# Patient Record
Sex: FEMALE | Race: BLACK OR AFRICAN AMERICAN | NOT HISPANIC OR LATINO | Employment: UNEMPLOYED | URBAN - METROPOLITAN AREA
[De-identification: names, ages, dates, MRNs, and addresses within clinical notes are randomized per-mention and may not be internally consistent; named-entity substitution may affect disease eponyms.]

---

## 2018-06-24 ENCOUNTER — HOSPITAL ENCOUNTER (INPATIENT)
Facility: HOSPITAL | Age: 48
LOS: 2 days | Discharge: HOME | DRG: 639 | End: 2018-06-26
Attending: EMERGENCY MEDICINE | Admitting: HOSPITALIST
Payer: MEDICAID

## 2018-06-24 DIAGNOSIS — R73.9 HYPERGLYCEMIA: Primary | ICD-10-CM

## 2018-06-24 PROBLEM — E11.9 NEW ONSET TYPE 2 DIABETES MELLITUS: Status: ACTIVE | Noted: 2018-06-24

## 2018-06-24 PROBLEM — E11.10 DIABETIC KETOACIDOSIS WITHOUT COMA ASSOCIATED WITH TYPE 2 DIABETES MELLITUS: Status: ACTIVE | Noted: 2018-06-24

## 2018-06-24 PROBLEM — A59.9 TRICHOMONAS INFECTION: Status: ACTIVE | Noted: 2018-06-24

## 2018-06-24 LAB
ALBUMIN SERPL BCP-MCNC: 3.7 G/DL
ALLENS TEST: ABNORMAL
ALP SERPL-CCNC: 108 U/L
ALT SERPL W/O P-5'-P-CCNC: 26 U/L
ANION GAP SERPL CALC-SCNC: 10 MMOL/L
ANION GAP SERPL CALC-SCNC: 16 MMOL/L
AST SERPL-CCNC: 23 U/L
B-HCG UR QL: NEGATIVE
B-OH-BUTYR BLD STRIP-SCNC: 1 MMOL/L
BACTERIA #/AREA URNS HPF: ABNORMAL /HPF
BASOPHILS # BLD AUTO: 0.08 K/UL
BASOPHILS NFR BLD: 1.1 %
BILIRUB SERPL-MCNC: 0.7 MG/DL
BILIRUB UR QL STRIP: NEGATIVE
BUN SERPL-MCNC: 11 MG/DL
BUN SERPL-MCNC: 8 MG/DL
CALCIUM SERPL-MCNC: 10.3 MG/DL
CALCIUM SERPL-MCNC: 8.7 MG/DL
CHLORIDE SERPL-SCNC: 102 MMOL/L
CHLORIDE SERPL-SCNC: 93 MMOL/L
CLARITY UR: CLEAR
CO2 SERPL-SCNC: 18 MMOL/L
CO2 SERPL-SCNC: 22 MMOL/L
COLOR UR: COLORLESS
CREAT SERPL-MCNC: 1.3 MG/DL
CREAT SERPL-MCNC: 1.7 MG/DL
CTP QC/QA: YES
DELSYS: ABNORMAL
DIFFERENTIAL METHOD: ABNORMAL
EOSINOPHIL # BLD AUTO: 0.1 K/UL
EOSINOPHIL NFR BLD: 1.5 %
ERYTHROCYTE [DISTWIDTH] IN BLOOD BY AUTOMATED COUNT: 13.4 %
EST. GFR  (AFRICAN AMERICAN): 41 ML/MIN/1.73 M^2
EST. GFR  (AFRICAN AMERICAN): 57 ML/MIN/1.73 M^2
EST. GFR  (NON AFRICAN AMERICAN): 36 ML/MIN/1.73 M^2
EST. GFR  (NON AFRICAN AMERICAN): 49 ML/MIN/1.73 M^2
ESTIMATED AVG GLUCOSE: ABNORMAL MG/DL
FIO2: 21
GLUCOSE SERPL-MCNC: 640 MG/DL
GLUCOSE SERPL-MCNC: 752 MG/DL
GLUCOSE SERPL-MCNC: >500 MG/DL (ref 70–110)
GLUCOSE UR QL STRIP: ABNORMAL
HBA1C MFR BLD HPLC: >14 %
HCO3 UR-SCNC: 27.2 MMOL/L (ref 24–28)
HCT VFR BLD AUTO: 37.1 %
HGB BLD-MCNC: 12.9 G/DL
HGB UR QL STRIP: NEGATIVE
KETONES UR QL STRIP: ABNORMAL
LEUKOCYTE ESTERASE UR QL STRIP: ABNORMAL
LYMPHOCYTES # BLD AUTO: 2.8 K/UL
LYMPHOCYTES NFR BLD: 38.6 %
MCH RBC QN AUTO: 27 PG
MCHC RBC AUTO-ENTMCNC: 34.8 G/DL
MCV RBC AUTO: 78 FL
MICROSCOPIC COMMENT: ABNORMAL
MODE: ABNORMAL
MONOCYTES # BLD AUTO: 0.5 K/UL
MONOCYTES NFR BLD: 6.4 %
NEUTROPHILS # BLD AUTO: 3.8 K/UL
NEUTROPHILS NFR BLD: 52.1 %
NITRITE UR QL STRIP: NEGATIVE
OSMOLALITY SERPL: 311 MOSM/KG
PCO2 BLDA: 41.7 MMHG (ref 35–45)
PH SMN: 7.42 [PH] (ref 7.35–7.45)
PH UR STRIP: 5 [PH] (ref 5–8)
PLATELET # BLD AUTO: 262 K/UL
PMV BLD AUTO: 11.1 FL
PO2 BLDA: 48 MMHG (ref 40–60)
POC BE: 2 MMOL/L
POC SATURATED O2: 84 % (ref 95–100)
POC TCO2: 28 MMOL/L (ref 24–29)
POCT GLUCOSE: 248 MG/DL (ref 70–110)
POCT GLUCOSE: 263 MG/DL (ref 70–110)
POCT GLUCOSE: 327 MG/DL (ref 70–110)
POCT GLUCOSE: 330 MG/DL (ref 70–110)
POCT GLUCOSE: 407 MG/DL (ref 70–110)
POCT GLUCOSE: 461 MG/DL (ref 70–110)
POCT GLUCOSE: 475 MG/DL (ref 70–110)
POTASSIUM SERPL-SCNC: 4.3 MMOL/L
POTASSIUM SERPL-SCNC: 4.5 MMOL/L
PROT SERPL-MCNC: 7.7 G/DL
PROT UR QL STRIP: NEGATIVE
RBC # BLD AUTO: 4.77 M/UL
RBC #/AREA URNS HPF: 1 /HPF (ref 0–4)
SAMPLE: ABNORMAL
SITE: ABNORMAL
SODIUM SERPL-SCNC: 127 MMOL/L
SODIUM SERPL-SCNC: 134 MMOL/L
SP GR UR STRIP: 1.02 (ref 1–1.03)
SP02: 98
SQUAMOUS #/AREA URNS HPF: ABNORMAL /HPF
TRICHOMONAS UR QL MICRO: ABNORMAL
URN SPEC COLLECT METH UR: ABNORMAL
UROBILINOGEN UR STRIP-ACNC: NEGATIVE EU/DL
WBC # BLD AUTO: 7.23 K/UL
WBC #/AREA URNS HPF: 7 /HPF (ref 0–5)
WBC CLUMPS URNS QL MICRO: ABNORMAL
YEAST URNS QL MICRO: ABNORMAL

## 2018-06-24 PROCEDURE — 63600175 PHARM REV CODE 636 W HCPCS: Performed by: EMERGENCY MEDICINE

## 2018-06-24 PROCEDURE — 85025 COMPLETE CBC W/AUTO DIFF WBC: CPT

## 2018-06-24 PROCEDURE — 96375 TX/PRO/DX INJ NEW DRUG ADDON: CPT

## 2018-06-24 PROCEDURE — 25000003 PHARM REV CODE 250: Performed by: EMERGENCY MEDICINE

## 2018-06-24 PROCEDURE — 94761 N-INVAS EAR/PLS OXIMETRY MLT: CPT

## 2018-06-24 PROCEDURE — 81025 URINE PREGNANCY TEST: CPT | Performed by: NURSE PRACTITIONER

## 2018-06-24 PROCEDURE — 82962 GLUCOSE BLOOD TEST: CPT

## 2018-06-24 PROCEDURE — 99900035 HC TECH TIME PER 15 MIN (STAT)

## 2018-06-24 PROCEDURE — 83036 HEMOGLOBIN GLYCOSYLATED A1C: CPT

## 2018-06-24 PROCEDURE — 82803 BLOOD GASES ANY COMBINATION: CPT

## 2018-06-24 PROCEDURE — 21400001 HC TELEMETRY ROOM

## 2018-06-24 PROCEDURE — 25000003 PHARM REV CODE 250: Performed by: HOSPITALIST

## 2018-06-24 PROCEDURE — 83930 ASSAY OF BLOOD OSMOLALITY: CPT

## 2018-06-24 PROCEDURE — 99285 EMERGENCY DEPT VISIT HI MDM: CPT | Mod: 25

## 2018-06-24 PROCEDURE — 80053 COMPREHEN METABOLIC PANEL: CPT

## 2018-06-24 PROCEDURE — 93005 ELECTROCARDIOGRAM TRACING: CPT

## 2018-06-24 PROCEDURE — 63600175 PHARM REV CODE 636 W HCPCS: Performed by: NURSE PRACTITIONER

## 2018-06-24 PROCEDURE — 93010 ELECTROCARDIOGRAM REPORT: CPT | Mod: ,,, | Performed by: INTERNAL MEDICINE

## 2018-06-24 PROCEDURE — 36415 COLL VENOUS BLD VENIPUNCTURE: CPT

## 2018-06-24 PROCEDURE — 81000 URINALYSIS NONAUTO W/SCOPE: CPT

## 2018-06-24 PROCEDURE — 25000003 PHARM REV CODE 250: Performed by: NURSE PRACTITIONER

## 2018-06-24 PROCEDURE — 82010 KETONE BODYS QUAN: CPT

## 2018-06-24 PROCEDURE — 96376 TX/PRO/DX INJ SAME DRUG ADON: CPT

## 2018-06-24 PROCEDURE — 96374 THER/PROPH/DIAG INJ IV PUSH: CPT

## 2018-06-24 PROCEDURE — 63600175 PHARM REV CODE 636 W HCPCS: Performed by: HOSPITALIST

## 2018-06-24 PROCEDURE — 80048 BASIC METABOLIC PNL TOTAL CA: CPT

## 2018-06-24 PROCEDURE — 96361 HYDRATE IV INFUSION ADD-ON: CPT

## 2018-06-24 RX ORDER — BUPROPION HYDROCHLORIDE 100 MG/1
100 TABLET, EXTENDED RELEASE ORAL 2 TIMES DAILY
COMMUNITY

## 2018-06-24 RX ORDER — POLYETHYLENE GLYCOL 3350 17 G/17G
17 POWDER, FOR SOLUTION ORAL DAILY
Status: DISCONTINUED | OUTPATIENT
Start: 2018-06-24 | End: 2018-06-26 | Stop reason: HOSPADM

## 2018-06-24 RX ORDER — OLANZAPINE 10 MG/1
15 TABLET ORAL NIGHTLY
COMMUNITY

## 2018-06-24 RX ORDER — ENOXAPARIN SODIUM 100 MG/ML
40 INJECTION SUBCUTANEOUS EVERY 24 HOURS
Status: DISCONTINUED | OUTPATIENT
Start: 2018-06-24 | End: 2018-06-26 | Stop reason: HOSPADM

## 2018-06-24 RX ORDER — ONDANSETRON 2 MG/ML
4 INJECTION INTRAMUSCULAR; INTRAVENOUS EVERY 8 HOURS PRN
Status: DISCONTINUED | OUTPATIENT
Start: 2018-06-24 | End: 2018-06-26 | Stop reason: HOSPADM

## 2018-06-24 RX ORDER — HYDROCODONE BITARTRATE AND ACETAMINOPHEN 5; 325 MG/1; MG/1
1 TABLET ORAL EVERY 4 HOURS PRN
Status: DISCONTINUED | OUTPATIENT
Start: 2018-06-24 | End: 2018-06-26 | Stop reason: HOSPADM

## 2018-06-24 RX ORDER — BUPROPION HYDROCHLORIDE 100 MG/1
100 TABLET, EXTENDED RELEASE ORAL 2 TIMES DAILY
Status: DISCONTINUED | OUTPATIENT
Start: 2018-06-24 | End: 2018-06-26 | Stop reason: HOSPADM

## 2018-06-24 RX ORDER — METRONIDAZOLE 500 MG/1
500 TABLET ORAL EVERY 12 HOURS
Status: DISCONTINUED | OUTPATIENT
Start: 2018-06-24 | End: 2018-06-26 | Stop reason: HOSPADM

## 2018-06-24 RX ORDER — PANTOPRAZOLE SODIUM 40 MG/1
40 FOR SUSPENSION ORAL DAILY
Status: DISCONTINUED | OUTPATIENT
Start: 2018-06-24 | End: 2018-06-26 | Stop reason: HOSPADM

## 2018-06-24 RX ORDER — IBUPROFEN 200 MG
16 TABLET ORAL
Status: DISCONTINUED | OUTPATIENT
Start: 2018-06-24 | End: 2018-06-26 | Stop reason: HOSPADM

## 2018-06-24 RX ORDER — SODIUM CHLORIDE 9 MG/ML
INJECTION, SOLUTION INTRAVENOUS CONTINUOUS
Status: DISCONTINUED | OUTPATIENT
Start: 2018-06-24 | End: 2018-06-25

## 2018-06-24 RX ORDER — ONDANSETRON 2 MG/ML
4 INJECTION INTRAMUSCULAR; INTRAVENOUS
Status: COMPLETED | OUTPATIENT
Start: 2018-06-24 | End: 2018-06-24

## 2018-06-24 RX ORDER — INSULIN ASPART 100 [IU]/ML
1-10 INJECTION, SOLUTION INTRAVENOUS; SUBCUTANEOUS
Status: DISCONTINUED | OUTPATIENT
Start: 2018-06-24 | End: 2018-06-26 | Stop reason: HOSPADM

## 2018-06-24 RX ORDER — IBUPROFEN 200 MG
24 TABLET ORAL
Status: DISCONTINUED | OUTPATIENT
Start: 2018-06-24 | End: 2018-06-26 | Stop reason: HOSPADM

## 2018-06-24 RX ORDER — MICONAZOLE NITRATE 2 %
1 CREAM WITH APPLICATOR VAGINAL NIGHTLY
Status: DISCONTINUED | OUTPATIENT
Start: 2018-06-24 | End: 2018-06-26 | Stop reason: HOSPADM

## 2018-06-24 RX ORDER — CYCLOBENZAPRINE HCL 5 MG
5 TABLET ORAL 3 TIMES DAILY PRN
Status: DISCONTINUED | OUTPATIENT
Start: 2018-06-24 | End: 2018-06-26 | Stop reason: HOSPADM

## 2018-06-24 RX ORDER — FLUCONAZOLE 150 MG/1
150 TABLET ORAL ONCE
Status: COMPLETED | OUTPATIENT
Start: 2018-06-24 | End: 2018-06-24

## 2018-06-24 RX ORDER — GLUCAGON 1 MG
1 KIT INJECTION
Status: DISCONTINUED | OUTPATIENT
Start: 2018-06-24 | End: 2018-06-26 | Stop reason: HOSPADM

## 2018-06-24 RX ORDER — CYCLOBENZAPRINE HCL 5 MG
5 TABLET ORAL 3 TIMES DAILY PRN
COMMUNITY

## 2018-06-24 RX ORDER — INSULIN ASPART 100 [IU]/ML
10 INJECTION, SOLUTION INTRAVENOUS; SUBCUTANEOUS
Status: DISCONTINUED | OUTPATIENT
Start: 2018-06-24 | End: 2018-06-25

## 2018-06-24 RX ADMIN — ENOXAPARIN SODIUM 40 MG: 100 INJECTION SUBCUTANEOUS at 05:06

## 2018-06-24 RX ADMIN — SODIUM CHLORIDE: 0.9 INJECTION, SOLUTION INTRAVENOUS at 04:06

## 2018-06-24 RX ADMIN — OLANZAPINE 15 MG: 10 TABLET, FILM COATED ORAL at 08:06

## 2018-06-24 RX ADMIN — ONDANSETRON 4 MG: 2 INJECTION INTRAMUSCULAR; INTRAVENOUS at 01:06

## 2018-06-24 RX ADMIN — INSULIN HUMAN 12 UNITS: 100 INJECTION, SOLUTION PARENTERAL at 01:06

## 2018-06-24 RX ADMIN — PANTOPRAZOLE SODIUM 40 MG: 40 GRANULE, DELAYED RELEASE ORAL at 09:06

## 2018-06-24 RX ADMIN — FLUCONAZOLE 150 MG: 150 TABLET ORAL at 11:06

## 2018-06-24 RX ADMIN — INSULIN ASPART 10 UNITS: 100 INJECTION, SOLUTION INTRAVENOUS; SUBCUTANEOUS at 12:06

## 2018-06-24 RX ADMIN — SODIUM CHLORIDE 1000 ML: 0.9 INJECTION, SOLUTION INTRAVENOUS at 01:06

## 2018-06-24 RX ADMIN — MICONAZOLE NITRATE 1 APPLICATOR: 20 CREAM VAGINAL at 08:06

## 2018-06-24 RX ADMIN — BUPROPION HYDROCHLORIDE 100 MG: 100 TABLET, FILM COATED, EXTENDED RELEASE ORAL at 08:06

## 2018-06-24 RX ADMIN — INSULIN HUMAN 10 UNITS: 100 INJECTION, SOLUTION PARENTERAL at 02:06

## 2018-06-24 RX ADMIN — METRONIDAZOLE 500 MG: 500 TABLET ORAL at 10:06

## 2018-06-24 RX ADMIN — METRONIDAZOLE 500 MG: 500 TABLET ORAL at 08:06

## 2018-06-24 RX ADMIN — INSULIN ASPART 10 UNITS: 100 INJECTION, SOLUTION INTRAVENOUS; SUBCUTANEOUS at 05:06

## 2018-06-24 RX ADMIN — POLYETHYLENE GLYCOL 3350 17 G: 17 POWDER, FOR SOLUTION ORAL at 09:06

## 2018-06-24 RX ADMIN — INSULIN DETEMIR 25 UNITS: 100 INJECTION, SOLUTION SUBCUTANEOUS at 08:06

## 2018-06-24 RX ADMIN — SODIUM CHLORIDE: 0.9 INJECTION, SOLUTION INTRAVENOUS at 08:06

## 2018-06-24 RX ADMIN — INSULIN ASPART 4 UNITS: 100 INJECTION, SOLUTION INTRAVENOUS; SUBCUTANEOUS at 09:06

## 2018-06-24 RX ADMIN — INSULIN ASPART 10 UNITS: 100 INJECTION, SOLUTION INTRAVENOUS; SUBCUTANEOUS at 08:06

## 2018-06-24 RX ADMIN — BUPROPION HYDROCHLORIDE 100 MG: 100 TABLET, FILM COATED, EXTENDED RELEASE ORAL at 09:06

## 2018-06-24 RX ADMIN — INSULIN ASPART 8 UNITS: 100 INJECTION, SOLUTION INTRAVENOUS; SUBCUTANEOUS at 05:06

## 2018-06-24 NOTE — PLAN OF CARE
06/24/18 0857   Discharge Assessment   Assessment Type Discharge Planning Assessment   Confirmed/corrected address and phone number on facesheet? Yes   Assessment information obtained from? Patient   Communicated expected length of stay with patient/caregiver no   Prior to hospitilization cognitive status: Alert/Oriented   Prior to hospitalization functional status: Independent   Current cognitive status: Alert/Oriented   Current Functional Status: Independent   Facility Arrived From: Home   Lives With sibling(s)   Able to Return to Prior Arrangements yes   Is patient able to care for self after discharge? Yes   Who are your caregiver(s) and their phone number(s)? Zofia-sister: [phone]   Patient's perception of discharge disposition home or selfcare   Readmission Within The Last 30 Days no previous admission in last 30 days   Patient currently being followed by outpatient case management? No   Patient currently receives any other outside agency services? No   Equipment Currently Used at Home none   Do you have any problems affording any of your prescribed medications? No   Is the patient taking medications as prescribed? yes   Does the patient have transportation home? Yes   Transportation Available family or friend will provide   Does the patient receive services at the [facility]? No   Discharge Plan A Other  (Unsure of any needs at this time)   Discharge Plan B Other  (TBD)   Patient/Family In Agreement With Plan yes     Independent at home with sister who can assist if needed; no assist usually need; no current services or DME; unsure of discharge needs at this time    PCP: Shantell Frederick NP  Emergency Contact Information: Maria Eugenia Williamsony  Address: [address] [place] [address]  Contact Number: [phone]  Relationship: Sister    [facility]  [address] [place], [place] [id number] [phone] (fax)    PCP: Phu Trotter MD  Extended Emergency Contact  Information:  Primary Emergency Contact: Yoana Gamez  Relationship: Spouse  Address: [address] [place] [address] [facility] [[email or web address]]  Home Phone: [phone]  Mobile Phone: [phone]    [facility] [address] [facility]  [address] [facility]   [place] [address]  Contact: [phone]  Fax: [phone]     Payor: [place] [employer] [facility] Med/Plan: Medicaid  Product Type: Medicaid    1)  Warning signs/symptoms information reviewed with and provided to patient in blue folder  2)  Discharge planning begins upon admission   3)  Discussed and reinforced patient responsibility for managing health care at home including:        a) Acquiring prescribed medications; b) following-through with scheduled follow-up appointments or scheduling those that could not be set; and c) monitoring health at home.

## 2018-06-24 NOTE — ED PROVIDER NOTES
"Encounter Date: 2018  SORT MSE:  Pt is a 47 y.o. female who presents for emergent consideration for cbg>500. Pt will be moved to room when one is available, otherwise will wait in waiting room with triage nurse supervision.  Pt arrived by ambulatory. She is not in distress. Orders have been placed. MEGAN Hay DNP [facility] 2018 0016       SCRIBE #1 NOTE: I, Anand Sequeira, am scribing for, and in the presence of,  Angel Bull MD. I have scribed the following portions of the note - Other sections scribed: HPI, ROS.       History     Chief Complaint   Patient presents with    Hyperglycemia     reports taking CBG at home and her machine read "high"; reports polyuria, polydipsia, and polyphagia; reports two episodes of diarrhea but multiple episodes of vomiting with constant causea     CC: Hyperglycemia    HPI: 46 y/o female smoker with medical hx of diabetes, psychosis, and PTSD presents to the ED c/o x3 week hx of hyperglycemia. Pt reports that she has been tracking it getting numbers that are "high", noting "400+" or "500+". Pt is also c/o chills, HA, polyuria, polydipsia, polyphagia, mild bilateral costal margin tenderness, and dysuria. Pt describes the dysuria as "not like a yeast infection" and "like someone is sticking me". Pt denies cough, SOB, diarrhea, ear pain, sore throat, any any other associated symptoms. No modifying factors or tx PTA.      The history is provided by the patient. No  was used.     Review of patient's allergies indicates:  Allergies not on file  Past Medical History:   Diagnosis Date    Anxiety     Depression     Diabetes mellitus     Hypertension     Panic attacks     Post traumatic stress disorder (PTSD)     Psychosis      Past Surgical History:   Procedure Laterality Date     SECTION      TUBAL LIGATION       Family History   Problem Relation Age of Onset    Diabetes Mother     Diabetes Father     Diabetes Sister     Diabetes Brother "      Social History   Substance Use Topics    Smoking status: Current Every Day Smoker     Packs/day: 0.50     Years: 25.00     Types: Cigarettes    Smokeless tobacco: Never Used    Alcohol use No     Review of Systems   Constitutional: Positive for chills. Negative for fever.   HENT: Negative for congestion, ear pain, rhinorrhea and sore throat.    Eyes: Negative for pain and redness.   Respiratory: Negative for cough and shortness of breath.    Cardiovascular: Negative for chest pain.   Gastrointestinal: Positive for abdominal pain (bilateral costal margin tenderness). Negative for diarrhea, nausea and vomiting.   Endocrine: Positive for polydipsia, polyphagia and polyuria.   Genitourinary: Positive for dysuria. Negative for flank pain and hematuria.   Musculoskeletal: Negative for back pain and neck pain.   Skin: Negative for rash.   Neurological: Positive for headaches. Negative for light-headedness and numbness.   All other systems reviewed and are negative.      Physical Exam     Initial Vitals [06/24/18 0021]   BP Pulse Resp Temp SpO2   104/71 110 19 98.9 °F (37.2 °C) 98 %      MAP       --         Physical Exam  The patient was examined specifically for the following:   General:No significant distress, Good color, Warm and dry. Head and neck:Scalp atraumatic, Neck supple. Neurological:Appropriate conversation, Gross motor deficits. Eyes:Conjugate gaze, Clear corneas. ENT: No epistaxis. Cardiac: Regular rate and rhythm, Grossly normal heart tones. Pulmonary: Wheezing, Rales. Gastrointestinal: Abdominal tenderness, Abdominal distention. Musculoskeletal: Extremity deformity, Apparent pain with range of motion of the joints. Skin: Rash.   The findings on examination were normal except for the following:  There is some minimal tenderness at the costal margins bilaterally.  There is extremely mild epigastric tenderness.  The lungs are clear.  The heart tones are normal. The abdomen is soft.    ED Course    Procedures  Labs Reviewed   CBC W/ AUTO DIFFERENTIAL - Abnormal; Notable for the following:        Result Value    MCV 78 (*)     All other components within normal limits   COMPREHENSIVE METABOLIC PANEL - Abnormal; Notable for the following:     Sodium 127 (*)     Chloride 93 (*)     CO2 18 (*)     Glucose 752 (*)     Creatinine 1.7 (*)     eGFR if  41 (*)     eGFR if non  36 (*)     All other components within normal limits    Narrative:     GLUCOSE  critical result(s) called and verbal readback obtained from   DR. YE.  , 06/24/2018 01:48   BETA - HYDROXYBUTYRATE, SERUM - Abnormal; Notable for the following:     Beta-Hydroxybutyrate 1.0 (*)     All other components within normal limits   URINALYSIS, REFLEX TO URINE CULTURE - Abnormal; Notable for the following:     Color, UA Colorless (*)     Glucose, UA 3+ (*)     Ketones, UA Trace (*)     Leukocytes, UA Trace (*)     All other components within normal limits   OSMOLALITY - Abnormal; Notable for the following:     Osmolality 311 (*)     All other components within normal limits   URINALYSIS MICROSCOPIC - Abnormal; Notable for the following:     WBC, UA 7 (*)     WBC Clumps, UA Occasional (*)     Yeast, UA Occasional (*)     Trichomonas, UA Few (*)     All other components within normal limits   ISTAT PROCEDURE - Abnormal; Notable for the following:     POC SATURATED O2 84 (*)     All other components within normal limits   POCT URINE PREGNANCY          Imaging Results    None                     Scribe Attestation:   Scribe #1: I performed the above scribed service and the documentation accurately describes the services I performed. I attest to the accuracy of the note.    Attending Attestation:           Physician Attestation for Scribe:  Physician Attestation Statement for Scribe #1: I, Angel Ye MD, reviewed documentation, as scribed by Anand Sequeira in my presence, and it is both accurate and complete.                     Clinical Impression:   The encounter diagnosis was Hyperglycemia.                             Angel Bull MD  07/02/18 4328

## 2018-06-24 NOTE — ED TRIAGE NOTES
"Pt. Ambulates to room with complains of dizziness, and high blood sugar. Pt. States she has been feeling dizzy with frequent thirst, frequent urination and frqequent hunger for the past 3 weeks. Pt. States states that she was told that she is borderline diabetic by her old doctor. Pt. States that she took her blood glucose reading at home and it states "HI" both times yesterday.Pt. Denies any dyspnea or chest pain.  "

## 2018-06-24 NOTE — PROGRESS NOTES
Results reported to Dr Bull.   Results for ORIANA UREÑA (MRN [id number]) as of 6/24/2018 03:29   Ref. Range 6/24/2018 02:01   POC PH Latest Ref Range: 7.35 - 7.45  7.422   POC PCO2 Latest Ref Range: 35 - 45 mmHg 41.7   POC PO2 Latest Ref Range: 40 - 60 mmHg 48   POC BE Latest Ref Range: -2 to 2 mmol/L 2   POC HCO3 Latest Ref Range: 24 - 28 mmol/L 27.2   POC SATURATED O2 Latest Ref Range: 95 - 100 % 84 (L)   POC TCO2 Latest Ref Range: 24 - 29 mmol/L 28   FiO2 Unknown 21   Sample Unknown VENOUS   DelSys Unknown Room Air   Allens Test Unknown N/A   Site Unknown Other   Mode Unknown SPONT

## 2018-06-24 NOTE — ASSESSMENT & PLAN NOTE
Diabetic diet  NS IVF  A1c pending  Basal -bolus, novolog with meals   Diabetes education   Will need own glucometer

## 2018-06-24 NOTE — HPI
"46 yo female with PTSD, tobacco abuse and psychosis presented with blood sugars >500. She reports strong family h/o diabetes and was using her sister's glucometer and it was persistently reading high. She has associated increased thirst and urination. Also describes a "burning and itch" with urination. On admit, BS >700. UA significant for trichomonas, 3+ glucose and trace ketones. Her pH was 7.4 and with normal AG, she was admitted to floor for IVF and diabetes management.    "

## 2018-06-24 NOTE — H&P
"[facility] Hospital Medicine  History & Physical    Patient Name: Madhavi Williamson  MRN: [id number]  Admission Date: 2018  Attending Physician: Dimple Andrew MD   Primary Care Provider: Shantell Frederick NP         Patient information was obtained from patient and ER records.     Subjective:     Principal Problem: abdominal pain. N/v   Chief Complaint:   Chief Complaint   Patient presents with    Hyperglycemia     reports taking CBG at home and her machine read "high"; reports polyuria, polydipsia, and polyphagia; reports two episodes of diarrhea but multiple episodes of vomiting with constant causea        HPI: 48 yo female with PTSD, tobacco abuse and psychosis presented with blood sugars >500. She reports strong family h/o diabetes and was using her sister's glucometer and it was persistently reading high. She has associated increased thirst and urination. Also describes a "burning and itch" with urination. On admit, BS >700. UA significant for trichomonas, 3+ glucose and trace ketones. Her pH was 7.4 and with normal AG, she was admitted to floor for IVF and diabetes management.      Past Medical History:   Diagnosis Date    Anxiety     Depression     Diabetes mellitus     Hypertension     Panic attacks     Post traumatic stress disorder (PTSD)     Psychosis        Past Surgical History:   Procedure Laterality Date     SECTION      TUBAL LIGATION         Review of patient's allergies indicates:   Allergen Reactions    Asa [aspirin]     Prozac [fluoxetine]     Seroquel [quetiapine]        No current facility-administered medications on file prior to encounter.      No current outpatient prescriptions on file prior to encounter.     Family History     Problem Relation (Age of Onset)    Diabetes Mother, Father, Sister, Brother        Social History Main Topics    Smoking status: Current Every Day Smoker     Packs/day: 0.50     Years: 25.00     Types: Cigarettes    " Smokeless tobacco: Never Used    Alcohol use No    Drug use: Yes     Types: Marijuana      Comment: hx of weed smoking    Sexual activity: Yes     Review of Systems   Constitutional: Positive for appetite change and fatigue.   HENT: Negative.    Eyes: Negative.    Respiratory: Negative.    Cardiovascular: Negative.    Gastrointestinal: Positive for abdominal pain, nausea and vomiting.   Endocrine: Positive for polydipsia and polyuria.   Genitourinary: Positive for vaginal discharge.   Musculoskeletal: Negative.    Skin: Negative.    Neurological: Negative.    Psychiatric/Behavioral: Negative.      Objective:     Vital Signs (Most Recent):  Temp: 97.6 °F (36.4 °C) (06/24/18 0715)  Pulse: 100 (06/24/18 0715)  Resp: 18 (06/24/18 0715)  BP: 130/73 (06/24/18 0715)  SpO2: 100 % (06/24/18 0718) Vital Signs (24h Range):  Temp:  [97.6 °F (36.4 °C)-98.9 °F (37.2 °C)] 97.6 °F (36.4 °C)  Pulse:  [] 100  Resp:  [18-19] 18  SpO2:  [97 %-100 %] 100 %  BP: (104-136)/(71-83) 130/73     Weight: 92.6 kg (204 lb 2.3 oz)  Body mass index is 31.5 kg/m².    Physical Exam   Constitutional: She is oriented to person, place, and time. She appears well-developed and well-nourished. No distress.   HENT:   Head: Normocephalic and atraumatic.   Mouth/Throat: Oropharynx is clear and moist.   Eyes: EOM are normal. Pupils are equal, round, and reactive to light.   Neck: Normal range of motion. Neck supple.   Cardiovascular: Regular rhythm, normal heart sounds and intact distal pulses.    Tachycardia S1 S2   Pulmonary/Chest: Effort normal and breath sounds normal. No respiratory distress.   Abdominal: Soft. Bowel sounds are normal. There is tenderness. There is no rebound.   Musculoskeletal: Normal range of motion. She exhibits no edema.   Neurological: She is alert and oriented to person, place, and time.   Skin: Skin is warm and dry. Capillary refill takes less than 2 seconds. No erythema.   Psychiatric: She has a normal mood and affect.  Her behavior is normal.         CRANIAL NERVES     CN III, IV, VI   Pupils are equal, round, and reactive to light.  Extraocular motions are normal.        Significant Labs:   A1C: No results for input(s): HGBA1C in the last 4320 hours.  CBC:   Recent Labs  Lab 06/24/18  0040   WBC 7.23   HGB 12.9   HCT 37.1        CMP:   Recent Labs  Lab 06/24/18  0040 06/24/18  0802   * 134*   K 4.5 4.3   CL 93* 102   CO2 18* 22*   * 640*   BUN 11 8   CREATININE 1.7* 1.3   CALCIUM 10.3 8.7   PROT 7.7  --    ALBUMIN 3.7  --    BILITOT 0.7  --    ALKPHOS 108  --    AST 23  --    ALT 26  --    ANIONGAP 16 10   EGFRNONAA 36* 49*     Cardiac Markers: No results for input(s): CKMB, MYOGLOBIN, BNP, TROPISTAT in the last 48 hours.  Magnesium: No results for input(s): MG in the last 48 hours.  POCT Glucose:   Recent Labs  Lab 06/24/18  0454 06/24/18  0714   POCTGLUCOSE 263* 461*     Troponin: No results for input(s): TROPONINI in the last 48 hours.  Urine Studies:   Recent Labs  Lab 06/24/18  0039   COLORU Colorless*   APPEARANCEUA Clear   PHUR 5.0   SPECGRAV 1.025   PROTEINUA Negative   GLUCUA 3+*   KETONESU Trace*   BILIRUBINUA Negative   OCCULTUA Negative   NITRITE Negative   UROBILINOGEN Negative   LEUKOCYTESUR Trace*   RBCUA 1   WBCUA 7*   BACTERIA Occasional   SQUAMEPITHEL Occasional       Significant Imaging: I have reviewed all pertinent imaging results/findings within the past 24 hours.    Assessment/Plan:     Trichomonas infection    Counseled on treatment of herself and sexual partner  Flagyl BID           New onset type 2 diabetes mellitus    Diabetic diet  NS IVF  A1c pending  Basal -bolus, novolog with meals   Diabetes education   Will need own glucometer           VTE Risk Mitigation         Ordered     enoxaparin injection 40 mg  Daily      06/24/18 0450     IP VTE HIGH RISK PATIENT  Once      06/24/18 0450             Dimple Andrew MD  Department of Hospital Medicine   [facility]

## 2018-06-24 NOTE — SUBJECTIVE & OBJECTIVE
Past Medical History:   Diagnosis Date    Anxiety     Depression     Diabetes mellitus     Hypertension     Panic attacks     Post traumatic stress disorder (PTSD)     Psychosis        Past Surgical History:   Procedure Laterality Date     SECTION      TUBAL LIGATION         Review of patient's allergies indicates:   Allergen Reactions    Asa [aspirin]     Prozac [fluoxetine]     Seroquel [quetiapine]        No current facility-administered medications on file prior to encounter.      No current outpatient prescriptions on file prior to encounter.     Family History     Problem Relation (Age of Onset)    Diabetes Mother, Father, Sister, Brother        Social History Main Topics    Smoking status: Current Every Day Smoker     Packs/day: 0.50     Years: 25.00     Types: Cigarettes    Smokeless tobacco: Never Used    Alcohol use No    Drug use: Yes     Types: Marijuana      Comment: hx of weed smoking    Sexual activity: Yes     Review of Systems   Constitutional: Positive for appetite change and fatigue.   HENT: Negative.    Eyes: Negative.    Respiratory: Negative.    Cardiovascular: Negative.    Gastrointestinal: Positive for abdominal pain, nausea and vomiting.   Endocrine: Positive for polydipsia and polyuria.   Genitourinary: Positive for vaginal discharge.   Musculoskeletal: Negative.    Skin: Negative.    Neurological: Negative.    Psychiatric/Behavioral: Negative.      Objective:     Vital Signs (Most Recent):  Temp: 97.6 °F (36.4 °C) (18)  Pulse: 100 (18)  Resp: 18 (18)  BP: 130/73 (18)  SpO2: 100 % (18) Vital Signs (24h Range):  Temp:  [97.6 °F (36.4 °C)-98.9 °F (37.2 °C)] 97.6 °F (36.4 °C)  Pulse:  [] 100  Resp:  [18-19] 18  SpO2:  [97 %-100 %] 100 %  BP: (104-136)/(71-83) 130/73     Weight: 92.6 kg (204 lb 2.3 oz)  Body mass index is 31.5 kg/m².    Physical Exam   Constitutional: She is oriented to person, place, and  time. She appears well-developed and well-nourished. No distress.   HENT:   Head: Normocephalic and atraumatic.   Mouth/Throat: Oropharynx is clear and moist.   Eyes: EOM are normal. Pupils are equal, round, and reactive to light.   Neck: Normal range of motion. Neck supple.   Cardiovascular: Regular rhythm, normal heart sounds and intact distal pulses.    Tachycardia S1 S2   Pulmonary/Chest: Effort normal and breath sounds normal. No respiratory distress.   Abdominal: Soft. Bowel sounds are normal. There is tenderness. There is no rebound.   Musculoskeletal: Normal range of motion. She exhibits no edema.   Neurological: She is alert and oriented to person, place, and time.   Skin: Skin is warm and dry. Capillary refill takes less than 2 seconds. No erythema.   Psychiatric: She has a normal mood and affect. Her behavior is normal.         CRANIAL NERVES     CN III, IV, VI   Pupils are equal, round, and reactive to light.  Extraocular motions are normal.        Significant Labs:   A1C: No results for input(s): HGBA1C in the last 4320 hours.  CBC:   Recent Labs  Lab 06/24/18  0040   WBC 7.23   HGB 12.9   HCT 37.1        CMP:   Recent Labs  Lab 06/24/18  0040 06/24/18  0802   * 134*   K 4.5 4.3   CL 93* 102   CO2 18* 22*   * 640*   BUN 11 8   CREATININE 1.7* 1.3   CALCIUM 10.3 8.7   PROT 7.7  --    ALBUMIN 3.7  --    BILITOT 0.7  --    ALKPHOS 108  --    AST 23  --    ALT 26  --    ANIONGAP 16 10   EGFRNONAA 36* 49*     Cardiac Markers: No results for input(s): CKMB, MYOGLOBIN, BNP, TROPISTAT in the last 48 hours.  Magnesium: No results for input(s): MG in the last 48 hours.  POCT Glucose:   Recent Labs  Lab 06/24/18  0454 06/24/18  0714   POCTGLUCOSE 263* 461*     Troponin: No results for input(s): TROPONINI in the last 48 hours.  Urine Studies:   Recent Labs  Lab 06/24/18  0039   COLORU Colorless*   APPEARANCEUA Clear   PHUR 5.0   SPECGRAV 1.025   PROTEINUA Negative   GLUCUA 3+*   KETONESU Trace*    BILIRUBINUA Negative   OCCULTUA Negative   NITRITE Negative   UROBILINOGEN Negative   LEUKOCYTESUR Trace*   RBCUA 1   WBCUA 7*   BACTERIA Occasional   SQUAMEPITHEL Occasional       Significant Imaging: I have reviewed all pertinent imaging results/findings within the past 24 hours.

## 2018-06-25 LAB
ANION GAP SERPL CALC-SCNC: 6 MMOL/L
BASOPHILS # BLD AUTO: 0.06 K/UL
BASOPHILS NFR BLD: 1 %
BUN SERPL-MCNC: 7 MG/DL
CALCIUM SERPL-MCNC: 8.4 MG/DL
CHLORIDE SERPL-SCNC: 109 MMOL/L
CO2 SERPL-SCNC: 24 MMOL/L
CREAT SERPL-MCNC: 1.1 MG/DL
DIFFERENTIAL METHOD: ABNORMAL
EOSINOPHIL # BLD AUTO: 0.2 K/UL
EOSINOPHIL NFR BLD: 3.7 %
ERYTHROCYTE [DISTWIDTH] IN BLOOD BY AUTOMATED COUNT: 13.5 %
EST. GFR  (AFRICAN AMERICAN): >60 ML/MIN/1.73 M^2
EST. GFR  (NON AFRICAN AMERICAN): >60 ML/MIN/1.73 M^2
GLUCOSE SERPL-MCNC: 392 MG/DL
HCT VFR BLD AUTO: 32.3 %
HGB BLD-MCNC: 10.9 G/DL
LYMPHOCYTES # BLD AUTO: 3.4 K/UL
LYMPHOCYTES NFR BLD: 54.6 %
MCH RBC QN AUTO: 26.8 PG
MCHC RBC AUTO-ENTMCNC: 33.7 G/DL
MCV RBC AUTO: 80 FL
MONOCYTES # BLD AUTO: 0.3 K/UL
MONOCYTES NFR BLD: 5.4 %
NEUTROPHILS # BLD AUTO: 2.2 K/UL
NEUTROPHILS NFR BLD: 35 %
PLATELET # BLD AUTO: 240 K/UL
PMV BLD AUTO: 11 FL
POCT GLUCOSE: 275 MG/DL (ref 70–110)
POCT GLUCOSE: 322 MG/DL (ref 70–110)
POCT GLUCOSE: 325 MG/DL (ref 70–110)
POCT GLUCOSE: 332 MG/DL (ref 70–110)
POCT GLUCOSE: >500 MG/DL (ref 70–110)
POTASSIUM SERPL-SCNC: 4.1 MMOL/L
RBC # BLD AUTO: 4.06 M/UL
SODIUM SERPL-SCNC: 139 MMOL/L
WBC # BLD AUTO: 6.25 K/UL

## 2018-06-25 PROCEDURE — 36415 COLL VENOUS BLD VENIPUNCTURE: CPT

## 2018-06-25 PROCEDURE — 94761 N-INVAS EAR/PLS OXIMETRY MLT: CPT

## 2018-06-25 PROCEDURE — 63600175 PHARM REV CODE 636 W HCPCS: Performed by: EMERGENCY MEDICINE

## 2018-06-25 PROCEDURE — 25000003 PHARM REV CODE 250: Performed by: HOSPITALIST

## 2018-06-25 PROCEDURE — 21400001 HC TELEMETRY ROOM

## 2018-06-25 PROCEDURE — 25000003 PHARM REV CODE 250: Performed by: EMERGENCY MEDICINE

## 2018-06-25 PROCEDURE — 63600175 PHARM REV CODE 636 W HCPCS: Performed by: HOSPITALIST

## 2018-06-25 PROCEDURE — 80048 BASIC METABOLIC PNL TOTAL CA: CPT

## 2018-06-25 PROCEDURE — 85025 COMPLETE CBC W/AUTO DIFF WBC: CPT

## 2018-06-25 RX ORDER — INSULIN ASPART 100 [IU]/ML
30 INJECTION, SUSPENSION SUBCUTANEOUS 2 TIMES DAILY WITH MEALS
Status: DISCONTINUED | OUTPATIENT
Start: 2018-06-26 | End: 2018-06-26

## 2018-06-25 RX ORDER — INSULIN ASPART 100 [IU]/ML
12 INJECTION, SOLUTION INTRAVENOUS; SUBCUTANEOUS
Status: DISCONTINUED | OUTPATIENT
Start: 2018-06-25 | End: 2018-06-26 | Stop reason: HOSPADM

## 2018-06-25 RX ORDER — INSULIN ASPART 100 [IU]/ML
25 INJECTION, SUSPENSION SUBCUTANEOUS 2 TIMES DAILY WITH MEALS
Status: DISCONTINUED | OUTPATIENT
Start: 2018-06-25 | End: 2018-06-25

## 2018-06-25 RX ADMIN — PANTOPRAZOLE SODIUM 40 MG: 40 GRANULE, DELAYED RELEASE ORAL at 08:06

## 2018-06-25 RX ADMIN — INSULIN ASPART 12 UNITS: 100 INJECTION, SOLUTION INTRAVENOUS; SUBCUTANEOUS at 04:06

## 2018-06-25 RX ADMIN — ENOXAPARIN SODIUM 40 MG: 100 INJECTION SUBCUTANEOUS at 04:06

## 2018-06-25 RX ADMIN — SODIUM CHLORIDE: 0.9 INJECTION, SOLUTION INTRAVENOUS at 05:06

## 2018-06-25 RX ADMIN — INSULIN ASPART 8 UNITS: 100 INJECTION, SOLUTION INTRAVENOUS; SUBCUTANEOUS at 12:06

## 2018-06-25 RX ADMIN — METRONIDAZOLE 500 MG: 500 TABLET ORAL at 10:06

## 2018-06-25 RX ADMIN — INSULIN ASPART 6 UNITS: 100 INJECTION, SOLUTION INTRAVENOUS; SUBCUTANEOUS at 04:06

## 2018-06-25 RX ADMIN — BUPROPION HYDROCHLORIDE 100 MG: 100 TABLET, FILM COATED, EXTENDED RELEASE ORAL at 08:06

## 2018-06-25 RX ADMIN — MICONAZOLE NITRATE 1 APPLICATOR: 20 CREAM VAGINAL at 10:06

## 2018-06-25 RX ADMIN — BUPROPION HYDROCHLORIDE 100 MG: 100 TABLET, FILM COATED, EXTENDED RELEASE ORAL at 10:06

## 2018-06-25 RX ADMIN — INSULIN ASPART 25 UNITS: 100 INJECTION, SUSPENSION SUBCUTANEOUS at 04:06

## 2018-06-25 RX ADMIN — INSULIN ASPART 8 UNITS: 100 INJECTION, SOLUTION INTRAVENOUS; SUBCUTANEOUS at 08:06

## 2018-06-25 RX ADMIN — OLANZAPINE 15 MG: 10 TABLET, FILM COATED ORAL at 10:06

## 2018-06-25 RX ADMIN — INSULIN ASPART 10 UNITS: 100 INJECTION, SOLUTION INTRAVENOUS; SUBCUTANEOUS at 08:06

## 2018-06-25 RX ADMIN — INSULIN ASPART 12 UNITS: 100 INJECTION, SOLUTION INTRAVENOUS; SUBCUTANEOUS at 12:06

## 2018-06-25 RX ADMIN — METRONIDAZOLE 500 MG: 500 TABLET ORAL at 08:06

## 2018-06-25 NOTE — PLAN OF CARE
Problem: Hyperglycemia, Persistent (Adult)  Intervention: Optimize Glycemic Control   06/25/18 0715   Nutrition Interventions   Glycemic Management blood glucose monitoring;carbohydrate replacement provided;oral hydration promoted;supplemental insulin given

## 2018-06-25 NOTE — NURSING
Iv fluid discontinued , insulin changed per md order.  No acute distress or changes on telemetry . Head of bed elevated side rails up x 3

## 2018-06-25 NOTE — PLAN OF CARE
Problem: Nutrition Imbalance: Excess Oral Intake (Adult)  Intervention: Promote Healthy Nutritional Intake/Lifestyle   06/25/18 0717   Nutrition Interventions   Oral Nutrition Promotion nutritional therapy counseling provided;rest periods promoted;safe use of adaptive equipment encouraged   Coping/Psychosocial Interventions   Supportive Measures active listening utilized;positive reinforcement provided;relaxation techniques promoted;self-care encouraged;verbalization of feelings encouraged

## 2018-06-25 NOTE — PROGRESS NOTES
TN met with pt at bedside. TN explained role in Case Management/Discharge Planning. TN inquired about HELP AT HOME. Pt stated that pt will have help at home with sister Zofia . TN reviewed HELP AT HOME and DISCHARGE PLANNING brochure of questions to think about while in the hospital. Pt voiced understanding. TN inquired about what pt feels she is RESPONSIBLE for to MANAGE HEALTH AT HOME. Pt stated going to MD appointments and picking up any/all prescriptions and taking as prescribed. Pt able to demonstrate understanding using teach back method.

## 2018-06-25 NOTE — NURSING
Bedside rounding report received from iraida hayden rn on patients progress and updated handoff report sheet. Denies pain when asked. Head of  Bed elevated side rails up x 2 iv site clean dry and intact. Assessment completed and plan updated on board and reviewed with patient.

## 2018-06-25 NOTE — NURSING
Diabetic teaching started this evening prior to supper meal provided the [facility] BD at home teaching kit for her to practice and provided teaching materials for patient to practice giving insulin injections by 1.00 unit insulin syringe and insulin pen patient was able to demonstrate proper injection technique and correctly draw up correct amount of insulin in syringe and pen . After getting her supper meal blood glucose level patient injected herself with correct amount of insulin as ordered and verified by myself prior to administration .

## 2018-06-25 NOTE — NURSING
Good appetite for lunch meal covered with scheduled insulin and moderate correction dose. No acute distress or change. Call light in reach head of bed elevated side rail up x 2.

## 2018-06-25 NOTE — NURSING
Patient has good appetite covered with sliding scale and regular scheduled insulin . Tolerating iv fluids. No acute change continue to monitor.

## 2018-06-25 NOTE — PROGRESS NOTES
Received bedside report. Patient AAOx4, telemetry monitoring in place, IV infusing site intact no distress noted. Safety maintained, call light in reach.

## 2018-06-26 VITALS
HEIGHT: 68 IN | HEART RATE: 85 BPM | RESPIRATION RATE: 17 BRPM | SYSTOLIC BLOOD PRESSURE: 111 MMHG | BODY MASS INDEX: 33.21 KG/M2 | WEIGHT: 219.13 LBS | OXYGEN SATURATION: 97 % | TEMPERATURE: 98 F | DIASTOLIC BLOOD PRESSURE: 71 MMHG

## 2018-06-26 LAB
ANION GAP SERPL CALC-SCNC: 6 MMOL/L
BASOPHILS # BLD AUTO: 0.04 K/UL
BASOPHILS NFR BLD: 0.7 %
BUN SERPL-MCNC: 7 MG/DL
CALCIUM SERPL-MCNC: 8.5 MG/DL
CHLORIDE SERPL-SCNC: 108 MMOL/L
CO2 SERPL-SCNC: 24 MMOL/L
CREAT SERPL-MCNC: 0.9 MG/DL
DIFFERENTIAL METHOD: ABNORMAL
EOSINOPHIL # BLD AUTO: 0.2 K/UL
EOSINOPHIL NFR BLD: 3.5 %
ERYTHROCYTE [DISTWIDTH] IN BLOOD BY AUTOMATED COUNT: 13.7 %
EST. GFR  (AFRICAN AMERICAN): >60 ML/MIN/1.73 M^2
EST. GFR  (NON AFRICAN AMERICAN): >60 ML/MIN/1.73 M^2
GLUCOSE SERPL-MCNC: 298 MG/DL
HCT VFR BLD AUTO: 33.4 %
HGB BLD-MCNC: 11.3 G/DL
LYMPHOCYTES # BLD AUTO: 2.8 K/UL
LYMPHOCYTES NFR BLD: 49 %
MCH RBC QN AUTO: 26.8 PG
MCHC RBC AUTO-ENTMCNC: 33.8 G/DL
MCV RBC AUTO: 79 FL
MONOCYTES # BLD AUTO: 0.3 K/UL
MONOCYTES NFR BLD: 5.9 %
NEUTROPHILS # BLD AUTO: 2.3 K/UL
NEUTROPHILS NFR BLD: 40.2 %
PLATELET # BLD AUTO: 241 K/UL
PMV BLD AUTO: 11 FL
POCT GLUCOSE: 389 MG/DL (ref 70–110)
POCT GLUCOSE: 458 MG/DL (ref 70–110)
POTASSIUM SERPL-SCNC: 3.8 MMOL/L
RBC # BLD AUTO: 4.22 M/UL
SODIUM SERPL-SCNC: 138 MMOL/L
WBC # BLD AUTO: 5.74 K/UL

## 2018-06-26 PROCEDURE — 36415 COLL VENOUS BLD VENIPUNCTURE: CPT

## 2018-06-26 PROCEDURE — 80048 BASIC METABOLIC PNL TOTAL CA: CPT

## 2018-06-26 PROCEDURE — 25000003 PHARM REV CODE 250: Performed by: HOSPITALIST

## 2018-06-26 PROCEDURE — 25000003 PHARM REV CODE 250: Performed by: EMERGENCY MEDICINE

## 2018-06-26 PROCEDURE — 85025 COMPLETE CBC W/AUTO DIFF WBC: CPT

## 2018-06-26 PROCEDURE — 94761 N-INVAS EAR/PLS OXIMETRY MLT: CPT

## 2018-06-26 PROCEDURE — 63600175 PHARM REV CODE 636 W HCPCS: Performed by: HOSPITALIST

## 2018-06-26 RX ORDER — METFORMIN HYDROCHLORIDE 500 MG/1
500 TABLET ORAL 2 TIMES DAILY WITH MEALS
Qty: 60 TABLET | Refills: 2 | Status: SHIPPED | OUTPATIENT
Start: 2018-06-26 | End: 2019-06-26

## 2018-06-26 RX ORDER — INSULIN ASPART 100 [IU]/ML
35 INJECTION, SUSPENSION SUBCUTANEOUS 2 TIMES DAILY
Qty: 21 ML | Refills: 2 | Status: SHIPPED | OUTPATIENT
Start: 2018-06-26 | End: 2018-09-24

## 2018-06-26 RX ORDER — INSULIN ASPART 100 [IU]/ML
35 INJECTION, SUSPENSION SUBCUTANEOUS 2 TIMES DAILY WITH MEALS
Status: DISCONTINUED | OUTPATIENT
Start: 2018-06-26 | End: 2018-06-26 | Stop reason: HOSPADM

## 2018-06-26 RX ORDER — METRONIDAZOLE 500 MG/1
500 TABLET ORAL EVERY 12 HOURS
Qty: 11 TABLET | Refills: 0 | Status: SHIPPED | OUTPATIENT
Start: 2018-06-26 | End: 2018-07-02

## 2018-06-26 RX ORDER — INSULIN ASPART 100 [IU]/ML
12 INJECTION, SOLUTION INTRAVENOUS; SUBCUTANEOUS
Qty: 10.8 ML | Refills: 2 | Status: SHIPPED | OUTPATIENT
Start: 2018-06-26 | End: 2018-09-24

## 2018-06-26 RX ADMIN — INSULIN ASPART 30 UNITS: 100 INJECTION, SUSPENSION SUBCUTANEOUS at 08:06

## 2018-06-26 RX ADMIN — PANTOPRAZOLE SODIUM 40 MG: 40 GRANULE, DELAYED RELEASE ORAL at 08:06

## 2018-06-26 RX ADMIN — INSULIN ASPART 10 UNITS: 100 INJECTION, SOLUTION INTRAVENOUS; SUBCUTANEOUS at 08:06

## 2018-06-26 RX ADMIN — INSULIN ASPART 10 UNITS: 100 INJECTION, SOLUTION INTRAVENOUS; SUBCUTANEOUS at 12:06

## 2018-06-26 RX ADMIN — BUPROPION HYDROCHLORIDE 100 MG: 100 TABLET, FILM COATED, EXTENDED RELEASE ORAL at 08:06

## 2018-06-26 RX ADMIN — INSULIN ASPART 12 UNITS: 100 INJECTION, SOLUTION INTRAVENOUS; SUBCUTANEOUS at 12:06

## 2018-06-26 RX ADMIN — METRONIDAZOLE 500 MG: 500 TABLET ORAL at 08:06

## 2018-06-26 RX ADMIN — INSULIN ASPART 12 UNITS: 100 INJECTION, SOLUTION INTRAVENOUS; SUBCUTANEOUS at 08:06

## 2018-06-26 RX ADMIN — POLYETHYLENE GLYCOL 3350 17 G: 17 POWDER, FOR SOLUTION ORAL at 08:06

## 2018-06-26 NOTE — PROGRESS NOTES
"[facility] Medicine  Progress Note    Patient Name: Madhavi Williamson  MRN: [id number]  Patient Class: IP- Inpatient   Admission Date: 6/24/2018  Length of Stay: 1 days  Attending Physician: Dimple Andrew MD  Primary Care Provider: Shantell Frederick NP        Subjective:     Principal Problem:<principal problem not specified>    HPI:  48 yo female with PTSD, tobacco abuse and psychosis presented with blood sugars >500. She reports strong family h/o diabetes and was using her sister's glucometer and it was persistently reading high. She has associated increased thirst and urination. Also describes a "burning and itch" with urination. On admit, BS >700. UA significant for trichomonas, 3+ glucose and trace ketones. Her pH was 7.4 and with normal AG, she was admitted to floor for IVF and diabetes management.      Hospital Course:  Pt admitted with new onset diabetes type 2. Changed to affordable insulin today and monitoring on new regimen. 70/30 with short acting at meal time and night.  Pt practiced insulin administration and did well. IF blood sugars continue to improve can dc tomorrow.    Interval History: pt did well with giving herself insulin, eating chips and I counseled on diabetic diet     Review of Systems   Constitutional: Positive for appetite change. Negative for fatigue.   HENT: Negative.    Eyes: Negative.    Respiratory: Negative.    Cardiovascular: Negative.    Gastrointestinal: Negative for abdominal pain, nausea and vomiting.   Endocrine: Negative for polydipsia and polyuria.   Genitourinary: Positive for vaginal discharge.   Musculoskeletal: Negative.    Skin: Negative.    Neurological: Negative.    Psychiatric/Behavioral: Negative.      Objective:     Vital Signs (Most Recent):  Temp: 98.9 °F (37.2 °C) (06/25/18 1903)  Pulse: 83 (06/25/18 1903)  Resp: 18 (06/25/18 1903)  BP: 112/72 (06/25/18 1903)  SpO2: 99 % (06/25/18 1903) Vital Signs (24h Range):  Temp:  [97.8 °F " (36.6 °C)-98.9 °F (37.2 °C)] 98.9 °F (37.2 °C)  Pulse:  [72-92] 83  Resp:  [18] 18  SpO2:  [96 %-100 %] 99 %  BP: (101-152)/(70-96) 112/72     Weight: 92.6 kg (204 lb 2.3 oz)  Body mass index is 31.5 kg/m².    Intake/Output Summary (Last 24 hours) at 06/25/18 2025  Last data filed at 06/25/18 1712   Gross per 24 hour   Intake          5762.58 ml   Output             2900 ml   Net          2862.58 ml      Physical Exam   Constitutional: She is oriented to person, place, and time. She appears well-developed and well-nourished. No distress.   HENT:   Head: Normocephalic and atraumatic.   Mouth/Throat: Oropharynx is clear and moist.   Eyes: EOM are normal. Pupils are equal, round, and reactive to light.   Neck: Normal range of motion. Neck supple.   Cardiovascular: Regular rhythm, normal heart sounds and intact distal pulses.    Pulmonary/Chest: Effort normal and breath sounds normal. No respiratory distress.   Abdominal: Soft. Bowel sounds are normal. There is tenderness. There is no rebound.   Musculoskeletal: Normal range of motion. She exhibits no edema.   Neurological: She is alert and oriented to person, place, and time.   Skin: Skin is warm and dry. Capillary refill takes less than 2 seconds. No erythema.   Psychiatric: She has a normal mood and affect. Her behavior is normal.       Significant Labs:   POCT Glucose:   Recent Labs  Lab 06/25/18  1137 06/25/18  1551 06/25/18  1958   POCTGLUCOSE 325* 275* 332*       Significant Imaging: I have reviewed all pertinent imaging results/findings within the past 24 hours.    Assessment/Plan:      Hyperglycemia      See above         Trichomonas infection    Counseled on treatment of herself and sexual partner  Flagyl BID           New onset type 2 diabetes mellitus    Diabetic diet- pt likely to not be compliant with vending machine snacks at her bedside   A1c >14%  70/30 and will need humalog with meals   Follow up for further diabetes management   Diabetes education    Will need own glucometer           VTE Risk Mitigation         Ordered     enoxaparin injection 40 mg  Daily      06/24/18 0450     IP VTE HIGH RISK PATIENT  Once      06/24/18 0450              Dimple Andrew MD  Department of Hospital Medicine   [facility]

## 2018-06-26 NOTE — NURSING
Prescriptions given to patient as follows : blood glucose meter mobile device kit , metformin , blood sugar test strips , lancets 32 gauge novolog flexpen u-100 , syringe 1ml disposable, novolog 100 unit/ml pen , flagyl 500 mg . Placed in blue folder . No questions or concerns avs booklet reviewed with patient .

## 2018-06-26 NOTE — PLAN OF CARE
06/26/18 1203   Final Note   Assessment Type Final Discharge Note   Discharge Disposition Home   Hospital Follow Up  Appt(s) scheduled? Yes   Discharge plans and expectations educations in teach back method with documentation complete? Yes   Right Care Referral Info   Post Acute Recommendation No Care     Follow-up Information     Vahe Segura MD On 6/29/2018.    Specialties:  General Surgery, Urgent Care  Why:  Appointment scheduled for Friday June 29th at 10am.  Contact information:  [address] [facility] [address]  [phone]                 [place] notified pts nurse Jess that she can proceed with nursing d/c instructions and teaching to complete D/C process

## 2018-06-26 NOTE — HOSPITAL COURSE
Pt admitted with new onset diabetes type 2 with significantly elevated BG of > 600 and Hgb A1c of > 14%. Attempted treatment with unknown dose of metformin from her sister's regimen without improvement. Initiated on insulin with good response, however regimen had to be changed to 70/30 with short acting regimen due to insurance coverage. Did well with insulin injection teaching. Able to draw insulin from vial and inject self. Also familiar with accuchecks and did well with this. Low dose metformin added to regimen to be increased gradually by PCP. Insulin dose increased to 35U BID with short acting insulin with meals for better control. Diabetic diet education to be continued as outpatient. Activity as tolerated. F/u with PCP within next 7 days.

## 2018-06-26 NOTE — NURSING
Bedside rounding report given to william rajput rn on patient progress and updated handoff report no acute events or changes on telemetry .

## 2018-06-26 NOTE — NURSING
Patient informed of discharge.     Telemetry removed and iv removed. Diabetic teaching completed patient denies concerns or additional needs with managing her diabetes at home.

## 2018-06-26 NOTE — PROGRESS NOTES
"EDUCATION provided with educational information on general DM education .  Information reviewed and placed in :My Healthcare Packet" to be brought home for pt to use as resource after discharge.  Information included:  signs and symptoms to look for and call the doctor if experiencing, and symptoms that may indicate a medical emergency: CALL 911.      All questions answered.  Teach back method used.    Patient stated, " that she was taught to do her insulin injections and feels as though she has a good understanding of how to inject self and when to check her sugars and s/s she should be aware of if she needs to contact MD and or report to the ER  ".        "

## 2018-06-26 NOTE — NURSING
Bedside rounding report received from william rajput rn on patients progress and updated handoff report received too.  Assessment completed no acute distress telemetry monitor is on. Call light in reach and head of bed elevated side rails up x 3 .

## 2018-06-26 NOTE — SUBJECTIVE & OBJECTIVE
Interval History: pt did well with giving herself insulin, eating chips and I counseled on diabetic diet     Review of Systems   Constitutional: Positive for appetite change. Negative for fatigue.   HENT: Negative.    Eyes: Negative.    Respiratory: Negative.    Cardiovascular: Negative.    Gastrointestinal: Negative for abdominal pain, nausea and vomiting.   Endocrine: Negative for polydipsia and polyuria.   Genitourinary: Positive for vaginal discharge.   Musculoskeletal: Negative.    Skin: Negative.    Neurological: Negative.    Psychiatric/Behavioral: Negative.      Objective:     Vital Signs (Most Recent):  Temp: 98.9 °F (37.2 °C) (06/25/18 1903)  Pulse: 83 (06/25/18 1903)  Resp: 18 (06/25/18 1903)  BP: 112/72 (06/25/18 1903)  SpO2: 99 % (06/25/18 1903) Vital Signs (24h Range):  Temp:  [97.8 °F (36.6 °C)-98.9 °F (37.2 °C)] 98.9 °F (37.2 °C)  Pulse:  [72-92] 83  Resp:  [18] 18  SpO2:  [96 %-100 %] 99 %  BP: (101-152)/(70-96) 112/72     Weight: 92.6 kg (204 lb 2.3 oz)  Body mass index is 31.5 kg/m².    Intake/Output Summary (Last 24 hours) at 06/25/18 2025  Last data filed at 06/25/18 1712   Gross per 24 hour   Intake          5762.58 ml   Output             2900 ml   Net          2862.58 ml      Physical Exam   Constitutional: She is oriented to person, place, and time. She appears well-developed and well-nourished. No distress.   HENT:   Head: Normocephalic and atraumatic.   Mouth/Throat: Oropharynx is clear and moist.   Eyes: EOM are normal. Pupils are equal, round, and reactive to light.   Neck: Normal range of motion. Neck supple.   Cardiovascular: Regular rhythm, normal heart sounds and intact distal pulses.    Pulmonary/Chest: Effort normal and breath sounds normal. No respiratory distress.   Abdominal: Soft. Bowel sounds are normal. There is tenderness. There is no rebound.   Musculoskeletal: Normal range of motion. She exhibits no edema.   Neurological: She is alert and oriented to person, place, and  time.   Skin: Skin is warm and dry. Capillary refill takes less than 2 seconds. No erythema.   Psychiatric: She has a normal mood and affect. Her behavior is normal.       Significant Labs:   POCT Glucose:   Recent Labs  Lab 06/25/18  1137 06/25/18  1551 06/25/18 1958   POCTGLUCOSE 325* 275* 332*       Significant Imaging: I have reviewed all pertinent imaging results/findings within the past 24 hours.

## 2018-06-26 NOTE — NURSING
Report received by ABHISHEK Mercado. The pt is lying in bed resting. She is not in any pain or discomfort. Tele monitor in progress with alarms set. Safety precautions maintained and bed locked in lowest position. Side rails up X2. Call bell and personal items within reach. Will continue to monitor pt for any changes.

## 2018-06-26 NOTE — NURSING
Good appetite for breakfast demonstrated proper injection technique with very minimal critiquing reminded to count for ten seconds while pen is injected into skin then release second injection she demonstrated correctly without any reminding. Patient is not hesitant to give her own insulin. No telemetry changes. Iv site to left wrist clean dry and intact no swelling or drainage dressing clean and dry . Call light in reach head of bed elevated side rail up x 2 .

## 2018-06-26 NOTE — ASSESSMENT & PLAN NOTE
Diabetic diet- pt likely to not be compliant with vending machine snacks at her bedside   A1c >14%  70/30 and will need humalog with meals   Follow up for further diabetes management   Diabetes education   Will need own glucometer

## 2018-06-27 NOTE — DISCHARGE SUMMARY
"[facility] Medicine  Discharge Summary      Patient Name: Madhavi Williamson  MRN: [id number]  Admission Date: 6/24/2018  Hospital Length of Stay: 2 days  Discharge Date and Time:  06/26/2018 6:56 AM  Attending Physician: No att. providers found   Discharging Provider: Saranya Trotter MD  Primary Care Provider: Shantell Frederick NP      HPI:   46 yo female with PTSD, tobacco abuse and psychosis presented with blood sugars >500. She reports strong family h/o diabetes and was using her sister's glucometer and it was persistently reading high. She has associated increased thirst and urination. Also describes a "burning and itch" with urination. On admit, BS >700. UA significant for trichomonas, 3+ glucose and trace ketones. Her pH was 7.4 and with normal AG, she was admitted to floor for IVF and diabetes management.      * No surgery found *      Hospital Course:   Pt admitted with new onset diabetes type 2 with significantly elevated BG of > 600 and Hgb A1c of > 14%. Attempted treatment with unknown dose of metformin from her sister's regimen without improvement. Initiated on insulin with good response, however regimen had to be changed to 70/30 with short acting regimen due to insurance coverage. Did well with insulin injection teaching. Able to draw insulin from vial and inject self. Also familiar with accuchecks and did well with this. Low dose metformin added to regimen to be increased gradually by PCP. Insulin dose increased to 35U BID with short acting insulin with meals for better control. Also with a positive Trichomonas on UA. Is to complete 7 days of metronidazole. Diabetic diet education to be continued as outpatient. Activity as tolerated. F/u with PCP within next 7 days.      Consults:   Final Active Diagnoses:    Diagnosis Date Noted POA    PRINCIPAL PROBLEM:  Hyperglycemia [R73.9]  Yes    New onset type 2 diabetes mellitus [E11.9] 06/24/2018 Yes    Trichomonas infection [A59.9] " 06/24/2018 Yes      Problems Resolved During this Admission:    Diagnosis Date Noted Date Resolved POA       Discharged Condition: good    Disposition: Home or Self Care    Follow Up:  Follow-up Information     Vahe Segura MD On 6/29/2018.    Specialties:  General Surgery, Urgent Care  Why:  Appointment scheduled for Friday June 29th at 10am.  Contact information:  [address] [address]  [phone]                 Medications:  Reconciled Home Medications:      Medication List      START taking these medications    blood sugar diagnostic Strp  Use to inject insulin     blood-glucose meter,mobile dev Kit  1 each by Misc.(Non-Drug; Combo Route) route 4 (four) times daily before meals and nightly.     insulin aspart protamine-insulin aspart 100 unit/mL (70-30) Inpn pen  Commonly known as:  NovoLOG 70/30  Inject 35 Units into the skin 2 (two) times daily.     insulin aspart U-100 100 unit/mL Inpn pen  Commonly known as:  NovoLOG Flexpen U-100 Insulin  Inject 12 Units into the skin 3 (three) times daily with meals.     lancets 32 gauge Misc  1 lancet by Misc.(Non-Drug; Combo Route) route 4 (four) times daily with meals and nightly.     metFORMIN 500 MG tablet  Commonly known as:  GLUCOPHAGE  Take 1 tablet (500 mg total) by mouth 2 (two) times daily with meals.     metroNIDAZOLE 500 MG tablet  Commonly known as:  FLAGYL  Take 1 tablet (500 mg total) by mouth every 12 (twelve) hours. Start tonight at bedtime for 11 doses     syringe (disposable) 1 mL Syrg  Use to inject insulin        CONTINUE taking these medications    buPROPion 100 MG TBSR 12 hr tablet  Commonly known as:  WELLBUTRIN SR  Take 100 mg by mouth 2 (two) times daily.     cyclobenzaprine 5 MG tablet  Commonly known as:  FLEXERIL  Take 5 mg by mouth 3 (three) times daily as needed for Muscle spasms.     OLANZapine 10 MG tablet  Commonly known as:  ZyPREXA  Take 15 mg by mouth every evening.     PRILOSEC ORAL  Take by mouth.             Indwelling Lines/Drains at time of discharge:   Lines/Drains/Airways          No matching active lines, drains, or airways          Time spent on the discharge of patient: > 35 minutes  Patient was seen and examined on the date of discharge and determined to be suitable for discharge.         Saranya Trotter MD  Department of Hospital Medicine  [facility]

## 2019-02-24 ENCOUNTER — HOSPITAL ENCOUNTER (EMERGENCY)
Facility: HOSPITAL | Age: 49
Discharge: HOME | End: 2019-02-25
Attending: EMERGENCY MEDICINE
Payer: MEDICAID

## 2019-02-24 DIAGNOSIS — R05.9 COUGH: ICD-10-CM

## 2019-02-24 DIAGNOSIS — R68.89 FLU-LIKE SYMPTOMS: Primary | ICD-10-CM

## 2019-02-24 LAB
B-HCG UR QL: NEGATIVE
CTP QC/QA: YES
CTP QC/QA: YES
FLUAV AG NPH QL: NEGATIVE
FLUBV AG NPH QL: NEGATIVE
POCT GLUCOSE: 119 MG/DL (ref 70–110)

## 2019-02-24 PROCEDURE — 96361 HYDRATE IV INFUSION ADD-ON: CPT

## 2019-02-24 PROCEDURE — 99284 EMERGENCY DEPT VISIT MOD MDM: CPT | Mod: 25

## 2019-02-24 PROCEDURE — 96374 THER/PROPH/DIAG INJ IV PUSH: CPT

## 2019-02-24 PROCEDURE — 81025 URINE PREGNANCY TEST: CPT | Performed by: NURSE PRACTITIONER

## 2019-02-24 PROCEDURE — 96375 TX/PRO/DX INJ NEW DRUG ADDON: CPT

## 2019-02-24 PROCEDURE — 63600175 PHARM REV CODE 636 W HCPCS: Performed by: PHYSICIAN ASSISTANT

## 2019-02-24 PROCEDURE — 82962 GLUCOSE BLOOD TEST: CPT

## 2019-02-24 PROCEDURE — 25000003 PHARM REV CODE 250: Performed by: PHYSICIAN ASSISTANT

## 2019-02-24 RX ORDER — ONDANSETRON 2 MG/ML
4 INJECTION INTRAMUSCULAR; INTRAVENOUS
Status: COMPLETED | OUTPATIENT
Start: 2019-02-24 | End: 2019-02-24

## 2019-02-24 RX ORDER — KETOROLAC TROMETHAMINE 30 MG/ML
15 INJECTION, SOLUTION INTRAMUSCULAR; INTRAVENOUS
Status: DISCONTINUED | OUTPATIENT
Start: 2019-02-24 | End: 2019-02-24

## 2019-02-24 RX ORDER — KETOROLAC TROMETHAMINE 30 MG/ML
15 INJECTION, SOLUTION INTRAMUSCULAR; INTRAVENOUS
Status: COMPLETED | OUTPATIENT
Start: 2019-02-24 | End: 2019-02-24

## 2019-02-24 RX ORDER — ACETAMINOPHEN 325 MG/1
650 TABLET ORAL
Status: COMPLETED | OUTPATIENT
Start: 2019-02-24 | End: 2019-02-24

## 2019-02-24 RX ADMIN — ACETAMINOPHEN 650 MG: 325 TABLET, FILM COATED ORAL at 11:02

## 2019-02-24 RX ADMIN — KETOROLAC TROMETHAMINE 15 MG: 30 INJECTION, SOLUTION INTRAMUSCULAR at 11:02

## 2019-02-24 RX ADMIN — ONDANSETRON 4 MG: 2 INJECTION INTRAMUSCULAR; INTRAVENOUS at 11:02

## 2019-02-24 RX ADMIN — SODIUM CHLORIDE 1000 ML: 0.9 INJECTION, SOLUTION INTRAVENOUS at 11:02

## 2019-02-25 VITALS
RESPIRATION RATE: 16 BRPM | TEMPERATURE: 98 F | WEIGHT: 219 LBS | HEIGHT: 68 IN | SYSTOLIC BLOOD PRESSURE: 115 MMHG | DIASTOLIC BLOOD PRESSURE: 74 MMHG | HEART RATE: 92 BPM | OXYGEN SATURATION: 97 % | BODY MASS INDEX: 33.19 KG/M2

## 2019-02-25 PROCEDURE — 94640 AIRWAY INHALATION TREATMENT: CPT

## 2019-02-25 PROCEDURE — 25000242 PHARM REV CODE 250 ALT 637 W/ HCPCS: Performed by: PHYSICIAN ASSISTANT

## 2019-02-25 RX ORDER — AZITHROMYCIN 250 MG/1
TABLET, FILM COATED ORAL
Qty: 6 TABLET | Refills: 0 | Status: SHIPPED | OUTPATIENT
Start: 2019-02-25

## 2019-02-25 RX ORDER — ALBUTEROL SULFATE 2.5 MG/.5ML
2.5 SOLUTION RESPIRATORY (INHALATION)
Status: COMPLETED | OUTPATIENT
Start: 2019-02-25 | End: 2019-02-25

## 2019-02-25 RX ORDER — OSELTAMIVIR PHOSPHATE 75 MG/1
75 CAPSULE ORAL 2 TIMES DAILY
Qty: 10 CAPSULE | Refills: 0 | Status: SHIPPED | OUTPATIENT
Start: 2019-02-25 | End: 2019-03-02

## 2019-02-25 RX ORDER — PROMETHAZINE HYDROCHLORIDE AND DEXTROMETHORPHAN HYDROBROMIDE 6.25; 15 MG/5ML; MG/5ML
5 SYRUP ORAL NIGHTLY PRN
Qty: 118 ML | Refills: 0 | Status: SHIPPED | OUTPATIENT
Start: 2019-02-25 | End: 2019-03-07

## 2019-02-25 RX ADMIN — ALBUTEROL SULFATE 2.5 MG: 2.5 SOLUTION RESPIRATORY (INHALATION) at 12:02

## 2019-02-25 NOTE — DISCHARGE INSTRUCTIONS
Take ibuprofen and Tylenol for fever and body aches.  Alternate treatment with ibuprofen and Tylenol every 3 hr

## 2019-02-25 NOTE — ED PROVIDER NOTES
"Encounter Date: 2019  This is a SORT/MSE of a 48 y.o. female presenting to the ED with c/o flu-like symptoms x 1 day. Care will be transferred to an alternate provider when patient is roomed for a full evaluation and final disposition. Patient is aware that he/she is awaiting a room in the emergency department, where another provider will review results, evaluate and treat as needed. EROS Palma DNP     History     Chief Complaint   Patient presents with    Flu Like Symptoms     "I think I came down with the flu, a couple people in my household been coming down with it. Headaches, body aches, coughing, stuffy nose, sneezing."     Chief Complaint:  Generalized body aches  History of  Present Illness: History obtained from patient. This 48 y.o. female who has past medical history of diabetes and hypertension presents to the ED complaining of generalized body aches that began last night with associated chills, subjective fever, congestion, rhinorrhea, sore throat, productive cough, anterior chest wall pain only when coughing, nausea, dizziness that she describes as an off-balance sensation and fatigue.  She states she has been in contact with multiple people in her household that have been tested positive for the flu.  She reports decreased appetite and has not been orally hydrating herself.  She denies abdominal pain, vomiting, diarrhea, dysuria, hematuria, urinary frequency, vaginal discharge, vaginal bleeding, shortness of breath. Patient attempted treatment with ibuprofen with minimal relief.          Review of patient's allergies indicates:   Allergen Reactions    Asa [aspirin]     Prozac [fluoxetine]     Seroquel [quetiapine]      Past Medical History:   Diagnosis Date    Anxiety     Depression     Diabetes mellitus     Hypertension     Panic attacks     Post traumatic stress disorder (PTSD)     Psychosis      Past Surgical History:   Procedure Laterality Date     SECTION      TUBAL " LIGATION       Family History   Problem Relation Age of Onset    Diabetes Mother     Diabetes Father     Diabetes Sister     Diabetes Brother      Social History     Tobacco Use    Smoking status: Current Every Day Smoker     Packs/day: 0.50     Years: 25.00     Pack years: 12.50     Types: Cigarettes    Smokeless tobacco: Never Used   Substance Use Topics    Alcohol use: No    Drug use: Yes     Types: Marijuana     Comment: hx of weed smoking     Review of Systems   Constitutional: Positive for chills, fatigue and fever.   HENT: Positive for congestion, rhinorrhea and sore throat.    Eyes: Negative for pain.   Respiratory: Positive for cough. Negative for shortness of breath.    Cardiovascular: Positive for chest pain (Only with coughing).   Gastrointestinal: Positive for nausea. Negative for abdominal pain, diarrhea and vomiting.   Genitourinary: Negative for dysuria, frequency, hematuria, vaginal bleeding and vaginal discharge.   Musculoskeletal: Positive for myalgias. Negative for back pain.   Skin: Negative for rash.   Neurological: Positive for dizziness. Negative for headaches.       Physical Exam     Initial Vitals [02/24/19 2141]   BP Pulse Resp Temp SpO2   (!) 146/87 (!) 123 20 99.8 °F (37.7 °C) 96 %      MAP       --         Physical Exam    Nursing note and vitals reviewed.  Constitutional: She appears well-developed and well-nourished. No distress.   HENT:   Head: Normocephalic and atraumatic.   Right Ear: Tympanic membrane normal.   Left Ear: Tympanic membrane normal.   Nose: Nose normal.   Mouth/Throat: Uvula is midline, oropharynx is clear and moist and mucous membranes are normal.   Eyes: EOM are normal. Pupils are equal, round, and reactive to light.   Neck: Normal range of motion. Neck supple.   Cardiovascular: Normal rate, regular rhythm and normal heart sounds. Exam reveals no gallop and no friction rub.    No murmur heard.  Pulmonary/Chest: No respiratory distress. She has wheezes. She  has no rhonchi. She has no rales.   Patient has fine and expiratory wheezing in the bilateral bases.  Patient is coughing on exam.   Abdominal: Soft. Bowel sounds are normal. There is no tenderness. There is no rebound and no guarding.   Musculoskeletal: Normal range of motion.   Neurological: She is alert and oriented to person, place, and time. She has normal strength. No cranial nerve deficit or sensory deficit.   Skin: Skin is warm and dry. Capillary refill takes less than 2 seconds.   Psychiatric: She has a normal mood and affect.         ED Course   Procedures  Labs Reviewed   POCT GLUCOSE - Abnormal; Notable for the following components:       Result Value    POCT Glucose 119 (*)     All other components within normal limits   POCT INFLUENZA A/B   POCT URINE PREGNANCY   POCT GLUCOSE MONITORING CONTINUOUS          Imaging Results          X-Ray Chest PA And Lateral (Final result)  Result time 02/25/19 00:06:51    Final result by Makeda Garcia MD (02/25/19 00:06:51)                 Impression:      No acute abnormality.      Electronically signed by: Makeda Garcia  Date:    02/25/2019  Time:    00:06             Narrative:    EXAMINATION:  XR CHEST PA AND LATERAL    CLINICAL HISTORY:  Cough    TECHNIQUE:  PA and lateral views of the chest were performed.    COMPARISON:  None    FINDINGS:  The lungs are clear, with normal appearance of pulmonary vasculature and no pleural effusion or pneumothorax.    The cardiac silhouette is normal in size. The hilar and mediastinal contours are unremarkable.    Bones are intact.                                 Medical Decision Making:   ED Management:  This is an evaluation of a 48 y.o. female that presents to the Emergency Department for flu-like symptoms for 2 days The patient is a non-toxic, afebrile, and well appearing female. On physical exam ears and pharynx are without evidence of infection. Appears well hydrated with moist mucus membranes. Neck soft and supple  with no meningeal signs or cervical lymphadenopathy. Breath sounds are clear and equal bilaterally with no adventitious breath sounds, tachypnea or respiratory distress with room air pulse ox of 97% and no evidence of hypoxia.     Vital Signs Are Reassuring.  RESULTS:  Influenza negative. UPT negative. Glucose 119.  Chest x-ray shows no acute cardiopulmonary findings.    My overall impression is flu-like symptoms. Given patient's recent sick contacts, will treat with Tamiflu empirically.  Given patient's abnormal lung exam, will discharge home with Z-Enrico to cover for possible pneumonia.  I considered, but at this time, do not suspect OM, OE, strep pharyngitis, meningitis, pneumonia, or acute bacterial sinusitis.    ED Course:  Patient treated in the emergency department with IV fluids, Toradol, Tylenol, Zofran albuterol breathing treatment with significant improvement of symptoms. D/C Meds:  Tamiflu, promethazine DM and Z-Enrico. The diagnosis, treatment plan, instructions for follow-up and reevaluation with primary care as well as ED return precautions were discussed and understanding was verbalized. All questions or concerns have been addressed.     This case was discussed with Dr. Richey who is in agreement with my assessment and plan.                         Clinical Impression:       ICD-10-CM ICD-9-CM   1. Flu-like symptoms R68.89 780.99   2. Cough R05 786.2                                Micheal Celaya PA-C  02/25/19 0309

## 2019-02-25 NOTE — ED TRIAGE NOTES
Patient arrived to ED with c/o generalized body aches, productive cough, sore throat, generalized headache, congestion, runny nose, and sneezing x 1 day.  Denies fever, n/v, diarrhea, or sob.  No acute distress noted.

## 2020-08-10 ENCOUNTER — HOSPITAL ENCOUNTER (OUTPATIENT)
Dept: RADIOLOGY | Facility: HOSPITAL | Age: 50
Discharge: HOME | End: 2020-08-10
Attending: PHYSICIAN ASSISTANT
Payer: MEDICAID

## 2020-08-10 DIAGNOSIS — M25.561 BILATERAL KNEE PAIN: ICD-10-CM

## 2020-08-10 DIAGNOSIS — M25.562 BILATERAL KNEE PAIN: Primary | ICD-10-CM

## 2020-08-10 DIAGNOSIS — M25.561 BILATERAL KNEE PAIN: Primary | ICD-10-CM

## 2020-08-10 DIAGNOSIS — M25.562 BILATERAL KNEE PAIN: ICD-10-CM

## 2020-08-10 PROCEDURE — 73564 X-RAY EXAM KNEE 4 OR MORE: CPT | Mod: TC,FY,RT

## 2020-08-10 PROCEDURE — 73564 X-RAY EXAM KNEE 4 OR MORE: CPT | Mod: TC,FY,LT

## 2020-08-10 PROCEDURE — 73562 XR KNEE COMP 4 OR MORE VIEWS LEFT: ICD-10-PCS | Mod: 26,LT,, | Performed by: RADIOLOGY

## 2020-08-10 PROCEDURE — 73562 X-RAY EXAM OF KNEE 3: CPT | Mod: 26,RT,, | Performed by: RADIOLOGY

## 2020-08-10 PROCEDURE — 73562 X-RAY EXAM OF KNEE 3: CPT | Mod: 26,LT,, | Performed by: RADIOLOGY
